# Patient Record
Sex: FEMALE | Race: WHITE | NOT HISPANIC OR LATINO | URBAN - METROPOLITAN AREA
[De-identification: names, ages, dates, MRNs, and addresses within clinical notes are randomized per-mention and may not be internally consistent; named-entity substitution may affect disease eponyms.]

---

## 2017-01-25 ENCOUNTER — OFFICE VISIT (OUTPATIENT)
Dept: OBGYN | Facility: CLINIC | Age: 47
End: 2017-01-25
Payer: COMMERCIAL

## 2017-01-25 VITALS — WEIGHT: 201 LBS | HEIGHT: 67 IN | BODY MASS INDEX: 31.55 KG/M2

## 2017-01-25 DIAGNOSIS — Z11.51 SCREENING FOR HUMAN PAPILLOMAVIRUS: ICD-10-CM

## 2017-01-25 DIAGNOSIS — E03.9 ACQUIRED HYPOTHYROIDISM: ICD-10-CM

## 2017-01-25 DIAGNOSIS — Z12.4 SCREENING FOR CERVICAL CANCER: ICD-10-CM

## 2017-01-25 DIAGNOSIS — Z31.9 PROCREATIVE MANAGEMENT: ICD-10-CM

## 2017-01-25 DIAGNOSIS — Z01.419 ENCOUNTER FOR GYNECOLOGICAL EXAMINATION WITHOUT ABNORMAL FINDING: Primary | ICD-10-CM

## 2017-01-25 DIAGNOSIS — Z31.41 FERTILITY TESTING: ICD-10-CM

## 2017-01-25 PROCEDURE — G0145 SCR C/V CYTO,THINLAYER,RESCR: HCPCS | Performed by: OBSTETRICS & GYNECOLOGY

## 2017-01-25 PROCEDURE — 99386 PREV VISIT NEW AGE 40-64: CPT | Performed by: OBSTETRICS & GYNECOLOGY

## 2017-01-25 PROCEDURE — 87624 HPV HI-RISK TYP POOLED RSLT: CPT | Performed by: OBSTETRICS & GYNECOLOGY

## 2017-01-25 PROCEDURE — 83520 IMMUNOASSAY QUANT NOS NONAB: CPT | Mod: 90 | Performed by: OBSTETRICS & GYNECOLOGY

## 2017-01-25 PROCEDURE — 36415 COLL VENOUS BLD VENIPUNCTURE: CPT | Performed by: OBSTETRICS & GYNECOLOGY

## 2017-01-25 PROCEDURE — 99000 SPECIMEN HANDLING OFFICE-LAB: CPT | Performed by: OBSTETRICS & GYNECOLOGY

## 2017-01-25 RX ORDER — LEVOTHYROXINE SODIUM 88 UG/1
88 TABLET ORAL
COMMUNITY
Start: 2016-03-22

## 2017-01-25 ASSESSMENT — ANXIETY QUESTIONNAIRES
IF YOU CHECKED OFF ANY PROBLEMS ON THIS QUESTIONNAIRE, HOW DIFFICULT HAVE THESE PROBLEMS MADE IT FOR YOU TO DO YOUR WORK, TAKE CARE OF THINGS AT HOME, OR GET ALONG WITH OTHER PEOPLE: NOT DIFFICULT AT ALL
2. NOT BEING ABLE TO STOP OR CONTROL WORRYING: NOT AT ALL
5. BEING SO RESTLESS THAT IT IS HARD TO SIT STILL: NOT AT ALL
7. FEELING AFRAID AS IF SOMETHING AWFUL MIGHT HAPPEN: NOT AT ALL
1. FEELING NERVOUS, ANXIOUS, OR ON EDGE: NOT AT ALL
3. WORRYING TOO MUCH ABOUT DIFFERENT THINGS: NOT AT ALL
GAD7 TOTAL SCORE: 0
6. BECOMING EASILY ANNOYED OR IRRITABLE: NOT AT ALL

## 2017-01-25 ASSESSMENT — PATIENT HEALTH QUESTIONNAIRE - PHQ9: 5. POOR APPETITE OR OVEREATING: NOT AT ALL
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
## 2017-01-25 NOTE — MR AVS SNAPSHOT
"              After Visit Summary   1/25/2017    Edna Garzon    MRN: [id number]           Patient Information     Date Of Birth          1970        Visit Information        Provider Department      1/25/2017 10:30 AM Marilyn Roque MD [facility]        Today's Diagnoses     Encounter for gynecological examination without abnormal finding [Z01.419]    -  1     Screening for human papillomavirus         Screening for cervical cancer         Fertility testing         Procreative management            Follow-ups after your visit        Who to contact     If you have questions or need follow up information about today's clinic visit or your schedule please contact [facility] directly at [phone].  Normal or non-critical lab and imaging results will be communicated to you by MyChart, letter or phone within 4 business days after the clinic has received the results. If you do not hear from us within 7 days, please contact the clinic through MyChart or phone. If you have a critical or abnormal lab result, we will notify you by phone as soon as possible.  Submit refill requests through [employer] or call your pharmacy and they will forward the refill request to us. Please allow 3 business days for your refill to be completed.          Additional Information About Your Visit        MyChart Information     [employer] lets you send messages to your doctor, view your test results, renew your prescriptions, schedule appointments and more. To sign up, go to [email or web address] . Click on \"Log in\" on the left side of the screen, which will take you to the Welcome page. Then click on \"Sign up Now\" on the right side of the page.     You will be asked to enter the access code listed below, as well as some personal information. Please follow the directions to create your username and password.     Your access code is: [email or web address]  Expires: 4/25/2017 12:30 PM     Your access code will " " in 90 days. If you need help or a new code, please call your [place] clinic or [phone].        Care EveryWhere ID     This is your Care EveryWhere ID. This could be used by other organizations to access your [place] medical records  [id number]        Your Vitals Were     Height BMI (Body Mass Index) Last Period             5' 6.5\" (1.689 m) 31.96 kg/m2 2017 (Exact Date)          Blood Pressure from Last 3 Encounters:   No data found for BP    Weight from Last 3 Encounters:   17 201 lb (91.173 kg)              We Performed the Following     Anti-Mullerian hormone     HPV High Risk Types DNA Cervical     Pap imaged thin layer screen with HPV - recommended age 30 - 65        Primary Care Provider Office Phone # Fax #    Danny Murillo [phone]       [facility] [address]  [facility] [place] [address]        Thank you!     Thank you for choosing [facility] [place]  for your care. Our goal is always to provide you with excellent care. Hearing back from our patients is one way we can continue to improve our services. Please take a few minutes to complete the written survey that you may receive in the mail after your visit with us. Thank you!             Your Updated Medication List - Protect others around you: Learn how to safely use, store and throw away your medicines at www.disposemymeds.org.          This list is accurate as of: 17 12:30 PM.  Always use your most recent med list.                   Brand Name Dispense Instructions for use    levothyroxine 88 MCG tablet    SYNTHROID/LEVOTHROID     Take 88 mcg by mouth         "

## 2017-01-26 ASSESSMENT — ANXIETY QUESTIONNAIRES: GAD7 TOTAL SCORE: 0

## 2017-01-26 ASSESSMENT — PATIENT HEALTH QUESTIONNAIRE - PHQ9: SUM OF ALL RESPONSES TO PHQ QUESTIONS 1-9: 0

## 2017-01-27 LAB
COPATH REPORT: NORMAL
MIS SERPL-MCNC: 0.42 NG/ML
PAP: NORMAL

## 2017-01-30 LAB
FINAL DIAGNOSIS: NORMAL
HPV HR 12 DNA CVX QL NAA+PROBE: NEGATIVE
HPV16 DNA SPEC QL NAA+PROBE: NEGATIVE
HPV18 DNA SPEC QL NAA+PROBE: NEGATIVE
SPECIMEN DESCRIPTION: NORMAL

## 2017-03-16 ENCOUNTER — TELEPHONE (OUTPATIENT)
Dept: OBGYN | Facility: CLINIC | Age: 47
End: 2017-03-16

## 2017-03-16 NOTE — TELEPHONE ENCOUNTER
Notes Recorded by Marilyn Roque MD on 2/4/2017 at 8:47 PM  Pap is normal and HPV Is neg. Rec. Next cervical cancer screen in 5 yrs but strongly encourage annual exams and mammo. As far as her AMH for pregnancy it is quite low. It's not impossible to get pregnant but if she truly wanted to pursue this then there is nothing that would be better than just going straight to [facility] for a consult on IVF. They will tell her if using her own eggs is even an option or if it would have to be donor eggs in which case then she can make her decision. Can refer to CRM or [facility]. Patient does speak english but if doesn't seem to have adequate understanding then could use [place] interpretor    Informed pt of result note. Pt stated understanding and had no further questions. She stated she would thinks about her options and call back to inform what she decides.

## 2017-03-31 ENCOUNTER — OFFICE VISIT (OUTPATIENT)
Dept: OBGYN | Facility: CLINIC | Age: 47
End: 2017-03-31
Payer: COMMERCIAL

## 2017-03-31 VITALS
HEIGHT: 67 IN | WEIGHT: 200 LBS | BODY MASS INDEX: 31.39 KG/M2 | SYSTOLIC BLOOD PRESSURE: 122 MMHG | DIASTOLIC BLOOD PRESSURE: 80 MMHG

## 2017-03-31 DIAGNOSIS — N97.9 INFERTILITY, FEMALE: Primary | ICD-10-CM

## 2017-03-31 PROCEDURE — 99214 OFFICE O/P EST MOD 30 MIN: CPT | Performed by: OBSTETRICS & GYNECOLOGY

## 2017-03-31 RX ORDER — CLOMIPHENE CITRATE 50 MG/1
50 TABLET ORAL DAILY
Qty: 5 TABLET | Refills: 2 | Status: SHIPPED | OUTPATIENT
Start: 2017-03-31

## 2017-03-31 NOTE — MR AVS SNAPSHOT
"              After Visit Summary   3/31/2017    Edna Garzon    MRN: [id number]           Patient Information     Date Of Birth          1970        Visit Information        Provider Department      3/31/2017 2:00 PM Marilyn Roque MD [facility]        Today's Diagnoses     Infertility, female    -  1       Follow-ups after your visit        Who to contact     If you have questions or need follow up information about today's clinic visit or your schedule please contact [facility] directly at [phone].  Normal or non-critical lab and imaging results will be communicated to you by [employer], letter or phone within 4 business days after the clinic has received the results. If you do not hear from us within 7 days, please contact the clinic through [employer] or phone. If you have a critical or abnormal lab result, we will notify you by phone as soon as possible.  Submit refill requests through [employer] or call your pharmacy and they will forward the refill request to us. Please allow 3 business days for your refill to be completed.          Additional Information About Your Visit        MyChart Information     [employer] lets you send messages to your doctor, view your test results, renew your prescriptions, schedule appointments and more. To sign up, go to [email or web address] . Click on \"Log in\" on the left side of the screen, which will take you to the Welcome page. Then click on \"Sign up Now\" on the right side of the page.     You will be asked to enter the access code listed below, as well as some personal information. Please follow the directions to create your username and password.     Your access code is: [email or web address]  Expires: 2017  1:30 PM     Your access code will  in 90 days. If you need help or a new code, please call your [facility] or [phone].        Care EveryWhere ID     This is your Care EveryWhere ID. This could be used by other " "organizations to access your [place] medical records  [id number]        Your Vitals Were     Height Last Period BMI (Body Mass Index)             5' 6.5\" (1.689 m) 03/16/2017 (Approximate) 31.8 kg/m2          Blood Pressure from Last 3 Encounters:   03/31/17 122/80    Weight from Last 3 Encounters:   03/31/17 200 lb (90.7 kg)   01/25/17 201 lb (91.2 kg)              Today, you had the following     No orders found for display         Today's Medication Changes          These changes are accurate as of: 3/31/17  3:04 PM.  If you have any questions, ask your nurse or doctor.               Start taking these medicines.        Dose/Directions    clomiPHENE 50 MG tablet   Commonly known as:  CLOMID   Used for:  Infertility, female   Started by:  Marilyn Roque MD        Dose:  50 mg   Take 1 tablet (50 mg) by mouth daily   Quantity:  5 tablet   Refills:  2            Where to get your medicines      These medications were sent to [facility] [place] - [address]  [address] [address]     Phone:  [phone]     clomiPHENE 50 MG tablet                Primary Care Provider Office Phone # Fax #    Danny Murillo [phone]       [facility] [address] [facility] [address]  [facility] [address]        Thank you!     Thank you for choosing [facility] [place]  for your care. Our goal is always to provide you with excellent care. Hearing back from our patients is one way we can continue to improve our services. Please take a few minutes to complete the written survey that you may receive in the mail after your visit with us. Thank you!             Your Updated Medication List - Protect others around you: Learn how to safely use, store and throw away your medicines at www.disposemymeds.org.          This list is accurate as of: 3/31/17  3:04 PM.  Always use your most recent med list.                   Brand Name Dispense Instructions for use    " clomiPHENE 50 MG tablet    CLOMID    5 tablet    Take 1 tablet (50 mg) by mouth daily       levothyroxine 88 MCG tablet    SYNTHROID/LEVOTHROID     Take 88 mcg by mouth

## 2017-03-31 NOTE — PROGRESS NOTES
"    SUBJECTIVE:                                                   Edna Garzon is a 47 year old female who presents to clinic today for the following health issue(s):  Patient presents with:  Follow Up For: lab results regarding homone level      Additional information: NA    HPI:  Patient was scheduled to discuss \"birth control\" however is actually here to discuss getting pregnant.  Patient was here for annual exam recently and has a BF and really wants to get pregnant.   We did an AMH when she was last here and it was very low at 0.4. The RN's called her with that result and explained that she would have a very low chance of getting pregnant without IVF and realistically a lower than 5% chance of getting pregnant without donor eggs. Patient isn't financially able to do this and doesn't think she would do this with donor eggs either way.  Feels like she would like to at least try a medicine to see if it can help her get pregnant.  Has never tracked her cycles or checked for ovulation or anything like that.  Her BF hasn't ever had a semen analysis and she hasn't ever had an HSG though no history that would be suspicious for tubal issues    Patient's last menstrual period was 2017 (approximate)..   Patient is sexually active, .  Using none for contraception.    reports that she has never smoked. She does not have any smokeless tobacco history on file.    STD testing offered?  Declined    Health maintenance updated:  yes    Today's PHQ-2 Score: No flowsheet data found.  Today's PHQ-9 Score:   PHQ-9 SCORE 2017   Total Score 0     Today's AGUSTIN-7 Score:   AGUSTIN-7 SCORE 2017   Total Score 0       Problem list and histories reviewed & adjusted, as indicated.  Additional history: as documented.    Patient Active Problem List   Diagnosis     Acquired hypothyroidism     Past Surgical History:   Procedure Laterality Date     DILATION AND CURETTAGE SUCTION      EAB     KNEE SURGERY      after fall and " "patellar fracture      Social History   Substance Use Topics     Smoking status: Never Smoker     Smokeless tobacco: Not on file     Alcohol use No      Problem (# of Occurrences) Relation (Name,Age of Onset)    DIABETES (1) Mother    Hypertension (2) Mother, Father            Current Outpatient Prescriptions   Medication Sig     clomiPHENE (CLOMID) 50 MG tablet Take 1 tablet (50 mg) by mouth daily     levothyroxine (SYNTHROID/LEVOTHROID) 88 MCG tablet Take 88 mcg by mouth     No current facility-administered medications for this visit.      No Known Allergies    ROS:  12 point review of systems negative other than symptoms noted below.    OBJECTIVE:     /80  Ht 5' 6.5\" (1.689 m)  Wt 200 lb (90.7 kg)  LMP 03/16/2017 (Approximate)  BMI 31.8 kg/m2  Body mass index is 31.8 kg/(m^2).    Exam:  Constitutional:  Appearance: Well nourished, well developed alert, in no acute distress     In-Clinic Test Results:  No results found for this or any previous visit (from the past 24 hour(s)).    ASSESSMENT/PLAN:                                                        ICD-10-CM    1. Infertility, female N97.9 clomiPHENE (CLOMID) 50 MG tablet         Spent 25 minutes with her discussing all the components involved in getting pregnant, both male and female. Discussed her very low chance of pregnancy with her ovarian reserve where it is especially given her age. Patient understands this but would still adan to try something.  In trying to balance doing something with saving costs we will have the patient do 3 months of unmonitored clomid at 50mg day 3-7 and she will then need to do OPKs. Discussed how to do OPKs and when, where to find them, how they work, how to time I.C around a +OPK. Discussed follicle studies and IUIs or just clomid. Patient will just do clomid unmonitored at this point. If +OPKs for 3 months but not pregnant may f/u with me for higher dose clomid and maybe follicle study +/- IUI    Marilyn Roque, " MD  [facility] [place]

## 2023-01-06 ENCOUNTER — HOSPITAL ENCOUNTER (EMERGENCY)
Facility: CLINIC | Age: 53
Discharge: HOME | End: 2023-01-06
Attending: EMERGENCY MEDICINE | Admitting: EMERGENCY MEDICINE
Payer: COMMERCIAL

## 2023-01-06 ENCOUNTER — APPOINTMENT (OUTPATIENT)
Dept: MRI IMAGING | Facility: CLINIC | Age: 53
End: 2023-01-06
Attending: EMERGENCY MEDICINE
Payer: COMMERCIAL

## 2023-01-06 VITALS
RESPIRATION RATE: 16 BRPM | DIASTOLIC BLOOD PRESSURE: 78 MMHG | HEART RATE: 78 BPM | SYSTOLIC BLOOD PRESSURE: 139 MMHG | TEMPERATURE: 98.3 F | OXYGEN SATURATION: 99 %

## 2023-01-06 DIAGNOSIS — R03.0 ELEVATED BLOOD PRESSURE READING WITHOUT DIAGNOSIS OF HYPERTENSION: ICD-10-CM

## 2023-01-06 DIAGNOSIS — R73.9 HYPERGLYCEMIA: ICD-10-CM

## 2023-01-06 DIAGNOSIS — R42 VERTIGO: ICD-10-CM

## 2023-01-06 LAB
ANION GAP SERPL CALCULATED.3IONS-SCNC: 10 MMOL/L (ref 7–15)
BASOPHILS # BLD AUTO: 0 10E3/UL (ref 0–0.2)
BASOPHILS NFR BLD AUTO: 1 %
BUN SERPL-MCNC: 10.8 MG/DL (ref 6–20)
CALCIUM SERPL-MCNC: 8.9 MG/DL (ref 8.6–10)
CHLORIDE SERPL-SCNC: 102 MMOL/L (ref 98–107)
CREAT SERPL-MCNC: 0.59 MG/DL (ref 0.51–0.95)
DEPRECATED HCO3 PLAS-SCNC: 26 MMOL/L (ref 22–29)
EOSINOPHIL # BLD AUTO: 0.1 10E3/UL (ref 0–0.7)
EOSINOPHIL NFR BLD AUTO: 1 %
ERYTHROCYTE [DISTWIDTH] IN BLOOD BY AUTOMATED COUNT: 14.2 % (ref 10–15)
GFR SERPL CREATININE-BSD FRML MDRD: >90 ML/MIN/1.73M2
GLUCOSE SERPL-MCNC: 202 MG/DL (ref 70–99)
HCT VFR BLD AUTO: 41.7 % (ref 35–47)
HGB BLD-MCNC: 13.5 G/DL (ref 11.7–15.7)
HOLD SPECIMEN: NORMAL
HOLD SPECIMEN: NORMAL
IMM GRANULOCYTES # BLD: 0 10E3/UL
IMM GRANULOCYTES NFR BLD: 0 %
LYMPHOCYTES # BLD AUTO: 2 10E3/UL (ref 0.8–5.3)
LYMPHOCYTES NFR BLD AUTO: 29 %
MAGNESIUM SERPL-MCNC: 1.8 MG/DL (ref 1.7–2.3)
MCH RBC QN AUTO: 27.7 PG (ref 26.5–33)
MCHC RBC AUTO-ENTMCNC: 32.4 G/DL (ref 31.5–36.5)
MCV RBC AUTO: 86 FL (ref 78–100)
MONOCYTES # BLD AUTO: 0.5 10E3/UL (ref 0–1.3)
MONOCYTES NFR BLD AUTO: 7 %
NEUTROPHILS # BLD AUTO: 4.1 10E3/UL (ref 1.6–8.3)
NEUTROPHILS NFR BLD AUTO: 62 %
NRBC # BLD AUTO: 0 10E3/UL
NRBC BLD AUTO-RTO: 0 /100
PLATELET # BLD AUTO: 252 10E3/UL (ref 150–450)
POTASSIUM SERPL-SCNC: 4 MMOL/L (ref 3.4–5.3)
RBC # BLD AUTO: 4.88 10E6/UL (ref 3.8–5.2)
SODIUM SERPL-SCNC: 138 MMOL/L (ref 136–145)
T4 FREE SERPL-MCNC: 1.09 NG/DL (ref 0.9–1.7)
TROPONIN T SERPL HS-MCNC: <6 NG/L
TROPONIN T SERPL HS-MCNC: <6 NG/L
TSH SERPL DL<=0.005 MIU/L-ACNC: 5.91 UIU/ML (ref 0.3–4.2)
WBC # BLD AUTO: 6.7 10E3/UL (ref 4–11)

## 2023-01-06 PROCEDURE — 99285 EMERGENCY DEPT VISIT HI MDM: CPT | Mod: 25

## 2023-01-06 PROCEDURE — 258N000003 HC RX IP 258 OP 636: Performed by: EMERGENCY MEDICINE

## 2023-01-06 PROCEDURE — 36415 COLL VENOUS BLD VENIPUNCTURE: CPT | Performed by: EMERGENCY MEDICINE

## 2023-01-06 PROCEDURE — 85025 COMPLETE CBC W/AUTO DIFF WBC: CPT | Performed by: EMERGENCY MEDICINE

## 2023-01-06 PROCEDURE — 84439 ASSAY OF FREE THYROXINE: CPT | Performed by: EMERGENCY MEDICINE

## 2023-01-06 PROCEDURE — 80048 BASIC METABOLIC PNL TOTAL CA: CPT | Performed by: EMERGENCY MEDICINE

## 2023-01-06 PROCEDURE — 93005 ELECTROCARDIOGRAM TRACING: CPT

## 2023-01-06 PROCEDURE — A9585 GADOBUTROL INJECTION: HCPCS | Performed by: EMERGENCY MEDICINE

## 2023-01-06 PROCEDURE — 255N000002 HC RX 255 OP 636: Performed by: EMERGENCY MEDICINE

## 2023-01-06 PROCEDURE — 84443 ASSAY THYROID STIM HORMONE: CPT | Performed by: EMERGENCY MEDICINE

## 2023-01-06 PROCEDURE — 250N000013 HC RX MED GY IP 250 OP 250 PS 637: Performed by: EMERGENCY MEDICINE

## 2023-01-06 PROCEDURE — 96360 HYDRATION IV INFUSION INIT: CPT

## 2023-01-06 PROCEDURE — 83735 ASSAY OF MAGNESIUM: CPT | Performed by: EMERGENCY MEDICINE

## 2023-01-06 PROCEDURE — 250N000011 HC RX IP 250 OP 636: Performed by: EMERGENCY MEDICINE

## 2023-01-06 PROCEDURE — 84484 ASSAY OF TROPONIN QUANT: CPT | Performed by: EMERGENCY MEDICINE

## 2023-01-06 PROCEDURE — 96361 HYDRATE IV INFUSION ADD-ON: CPT

## 2023-01-06 PROCEDURE — 70553 MRI BRAIN STEM W/O & W/DYE: CPT

## 2023-01-06 PROCEDURE — 84484 ASSAY OF TROPONIN QUANT: CPT | Mod: 91 | Performed by: EMERGENCY MEDICINE

## 2023-01-06 PROCEDURE — 96374 THER/PROPH/DIAG INJ IV PUSH: CPT | Mod: 59

## 2023-01-06 RX ORDER — GADOBUTROL 604.72 MG/ML
11 INJECTION INTRAVENOUS ONCE
Status: COMPLETED | OUTPATIENT
Start: 2023-01-06 | End: 2023-01-06

## 2023-01-06 RX ORDER — MECLIZINE HYDROCHLORIDE 25 MG/1
25 TABLET ORAL ONCE
Status: COMPLETED | OUTPATIENT
Start: 2023-01-06 | End: 2023-01-06

## 2023-01-06 RX ORDER — ONDANSETRON 2 MG/ML
4 INJECTION INTRAMUSCULAR; INTRAVENOUS ONCE
Status: COMPLETED | OUTPATIENT
Start: 2023-01-06 | End: 2023-01-06

## 2023-01-06 RX ORDER — MECLIZINE HYDROCHLORIDE 25 MG/1
25 TABLET ORAL EVERY 6 HOURS PRN
Qty: 30 TABLET | Refills: 1 | Status: SHIPPED | OUTPATIENT
Start: 2023-01-06

## 2023-01-06 RX ADMIN — GADOBUTROL 11 ML: 604.72 INJECTION INTRAVENOUS at 15:04

## 2023-01-06 RX ADMIN — SODIUM CHLORIDE 1000 ML: 9 INJECTION, SOLUTION INTRAVENOUS at 14:46

## 2023-01-06 RX ADMIN — MECLIZINE HYDROCHLORIDE 25 MG: 25 TABLET ORAL at 14:47

## 2023-01-06 RX ADMIN — ONDANSETRON 4 MG: 2 INJECTION INTRAMUSCULAR; INTRAVENOUS at 14:47

## 2023-01-06 ASSESSMENT — ENCOUNTER SYMPTOMS
NAUSEA: 1
WEAKNESS: 0
NUMBNESS: 0
HEADACHES: 0
SHORTNESS OF BREATH: 0
DIZZINESS: 1
VOMITING: 0
PALPITATIONS: 0
DIARRHEA: 0
MYALGIAS: 1
SPEECH DIFFICULTY: 0

## 2023-01-06 ASSESSMENT — ACTIVITIES OF DAILY LIVING (ADL)
ADLS_ACUITY_SCORE: 33
ADLS_ACUITY_SCORE: 33

## 2023-01-06 NOTE — DISCHARGE INSTRUCTIONS
Meclizine as needed for dizziness.  Lots of fluids.  Start taking your blood pressure twice a day.  Keep a log of this and take it to a primary care provider appointment.  They will be able to determine if you need to start blood pressure medications and discuss the right dose with this information. You will need to call to arrange an appointment.  Clinic provided.  You also have high blood sugar today.  You can talk with primary care about this or your endocrinologist.  You want to make sure you do not need started on medications for diabetes.  Return immediately if you have worsening of symptoms or any new concerns.

## 2023-01-06 NOTE — ED TRIAGE NOTES
Presents from clinic with hypertension, ekg from clinic shows NSR. Went to clinic this AM for dizziness and fatigue over the past few days. Hypertensive in triage otherwise VSS on RA.

## 2023-01-06 NOTE — ED PROVIDER NOTES
"    History     Chief Complaint:  Dizziness     The history is provided by the patient.      Edna Pelayo V is a 52 year old female who presents with dizziness. For the last week she has had dizziness described as a room-spinning sensation upon changing position. She describes nausea and intermittent blurred vision with a \"crampy, heavy\" sensation in her legs. She denies weakness, numbness, tingling, headache, or speech change. She also denies chest pain, shortness of breath, palpitations, vomiting, and diarrhea. She has had no recent head injury.    She has been checking her blood pressure at home (she has no history of hypertension but has been using her mother's cuff) and when it was 160s systolic she presented to urgent care. They referred her to the ED.     Independent Historian: Yes, as above     Review of External Notes:  Urgent Care visit today     ROS:  Review of Systems   Eyes: Positive for visual disturbance.   Respiratory: Negative for shortness of breath.    Cardiovascular: Negative for chest pain and palpitations.   Gastrointestinal: Positive for nausea. Negative for diarrhea and vomiting.   Musculoskeletal: Positive for myalgias (leg pain).   Neurological: Positive for dizziness. Negative for speech difficulty, weakness, numbness and headaches.        Negative for paresthesias   All other systems reviewed and are negative.    Allergies:  No Known Allergies     Medications:    Wellbutrin  Zovirax  Vitamin D   Synthroid  Prilosec    Past Medical History:    Hypothyroidism  Depression     Social History:  Patient is .   No PCP, but has an endocrinologist.  Does not smoke or use alcohol.     Physical Exam     Patient Vitals for the past 24 hrs:   BP Temp Temp src Pulse Resp SpO2   01/06/23 1703 139/78 -- -- 78 16 99 %   01/06/23 1450 (!) 145/86 -- -- 72 -- 99 %   01/06/23 1147 (!) 175/114 -- -- -- -- --   01/06/23 1145 -- 98.3  F (36.8  C) Temporal 88 19 98 %      Physical " Exam  General: Well-developed and well-nourished. Well appearing middle aged woman. Cooperative.  Head:  Atraumatic.  Eyes:  Conjunctivae, lids, and sclerae are normal.  Neck:  Supple. Normal range of motion.  CV:  Regular rate and rhythm. Normal heart sounds with no murmurs, rubs, or gallops detected.  Resp:  No respiratory distress. Clear to auscultation bilaterally without decreased breath sounds, wheezing, rales, or rhonchi.  GI:  Soft. Non-distended. Non-tender.    MS:  Normal ROM. No bilateral lower extremity edema.  Skin:  Warm. Non-diaphoretic. No pallor.  Neuro: Awake. A&Ox3.    Strength 5/5 bilateral upper and lower extremities.    No pronator drift.    Sensation intact to light touch.    No facial droop. No dysarthria.    No aphasia.    PERRL.     No dysmetria.    No dysdiadochokinesis.  Psych:  Normal mood and affect. Normal speech.  Vitals reviewed    Emergency Department Course   EKG  Indication: Hypertension and dizziness  Time: 1232  Rate 79 bpm. UT interval 160. QRS duration 90. QT/QTc 396/454.   Normal sinus rhythm. Normal ECG.  No acute ST changes.  No prior for comparison.     Imaging:  MR Brain w/o & w Contrast   Final Result   IMPRESSION:    1.  No acute intracranial finding. No evidence for recent ischemia,   intracranial hemorrhage, or mass.   2.  A few T2 hyperintense foci in the white matter are nonspecific but   most commonly reflect gliosis related to prior ischemic or   inflammatory insult.      HESHAM GATICA MD            SYSTEM ID:  [id number]         Report per radiology    Laboratory:  Labs Ordered and Resulted from Time of ED Arrival to Time of ED Departure   BASIC METABOLIC PANEL - Abnormal       Result Value    Sodium 138      Potassium 4.0      Chloride 102      Carbon Dioxide (CO2) 26      Anion Gap 10      Urea Nitrogen 10.8      Creatinine 0.59      Calcium 8.9      Glucose 202 (*)     GFR Estimate >90     TSH WITH FREE T4 REFLEX - Abnormal    TSH 5.91 (*)    TROPONIN T,  HIGH SENSITIVITY - Normal    Troponin T, High Sensitivity <6     TROPONIN T, HIGH SENSITIVITY - Normal    Troponin T, High Sensitivity <6     MAGNESIUM - Normal    Magnesium 1.8     T4 FREE - Normal    Free T4 1.09     CBC WITH PLATELETS AND DIFFERENTIAL    WBC Count 6.7      RBC Count 4.88      Hemoglobin 13.5      Hematocrit 41.7      MCV 86      MCH 27.7      MCHC 32.4      RDW 14.2      Platelet Count 252      % Neutrophils 62      % Lymphocytes 29      % Monocytes 7      % Eosinophils 1      % Basophils 1      % Immature Granulocytes 0      NRBCs per 100 WBC 0      Absolute Neutrophils 4.1      Absolute Lymphocytes 2.0      Absolute Monocytes 0.5      Absolute Eosinophils 0.1      Absolute Basophils 0.0      Absolute Immature Granulocytes 0.0      Absolute NRBCs 0.0        Emergency Department Course & Assessments:  Interventions:  Medications   0.9% sodium chloride BOLUS (1000 mLs Intravenous Stopped 1/6/23 1700)   meclizine (ANTIVERT) tablet 25 mg (25 mg Oral Given 1/6/23 1447)   ondansetron (ZOFRAN) injection 4 mg (4 mg Intravenous Given 1/6/23 1447)      Consultations/Discussion of Management or Tests:  ED Course as of 01/06/23 1642   Fri Jan 06, 2023   1420 I obtained the history and examined the patient as noted above.    1640 I rechecked and updated the patient. She was feeling improved.     Social Determinants of Health affecting care:  Supportive  and friend. English as a second language.    Disposition:  The patient was discharged.     Impression & Plan    Medical Decision Making:  Edna is a 52 year old woman presenting with 1 week of positional vertigo, intermittent blurred vision, and a heavy sensation in her legs.  She ultimately sought care today because she has been checking her blood pressure at home and found it elevated at 160s systolic.  It is unclear why she was checking her blood pressure as she does not have a history of hypertension.  Here she appears well with blood pressure  improving from 170s systolic to 139 systolic without intervention.  She has no focal neurologic deficits.  However, because she is having vision changes and subjective leg symptoms, she was sent for MRI of the brain.  This does not reveal acute pathology with nonspecific foci likely reflecting gliosis.  Her EKG is similarly reassuring without acute ST changes or arrhythmias.  2 troponins were both undetectable and a cardiac etiology for her symptoms is considered unlikely.  There is no kidney injury, electrolyte derangement, leukocytosis, or anemia.  There is hyperglycemia to 202 without prior diagnosis of diabetes.  Her TSH is abnormal but free T4 is normal.    During her work-up she was treated with IV fluids, meclizine, and Zofran and on repeat evaluation she is feeling improved.  She is comfortable with plan for discharge with meclizine as needed and lots of fluids.  I have recommended she check her blood pressure just twice a day and keep a log of this to take to her primary care provider follow up appointment and determine if she needs to be started on antihypertensives or not.  I have also encouraged her to follow-up with her endocrinologist regarding her TSH and hyperglycemia.  She understands to return with worsening or new symptoms.  All questions answered.  Amenable to discharge.    Diagnosis:    ICD-10-CM    1. Vertigo  R42       2. Elevated blood pressure reading without diagnosis of hypertension  R03.0       3. Hyperglycemia  R73.9            Discharge Medications:  Discharge Medication List as of 1/6/2023  5:00 PM      START taking these medications    Details   meclizine (ANTIVERT) 25 MG tablet Take 1 tablet (25 mg) by mouth every 6 hours as needed for dizziness, Disp-30 tablet, R-1, E-Prescribe              Scribe Disclosure:  ELINOR, Stephanie Rodriguez, am serving as a scribe at 2:17 PM on 1/6/2023 to document services personally performed by Linette Mena MD based on my observations and the provider's  statements to me.     1/6/2023   Linette Mena MD Dixson, Kylie S, MD  01/23/23 2035

## 2023-01-07 LAB
ATRIAL RATE - MUSE: 79 BPM
DIASTOLIC BLOOD PRESSURE - MUSE: NORMAL MMHG
INTERPRETATION ECG - MUSE: NORMAL
P AXIS - MUSE: 28 DEGREES
PR INTERVAL - MUSE: 160 MS
QRS DURATION - MUSE: 90 MS
QT - MUSE: 396 MS
QTC - MUSE: 454 MS
R AXIS - MUSE: 12 DEGREES
SYSTOLIC BLOOD PRESSURE - MUSE: NORMAL MMHG
T AXIS - MUSE: 43 DEGREES
VENTRICULAR RATE- MUSE: 79 BPM

## 2023-05-06 ENCOUNTER — HEALTH MAINTENANCE LETTER (OUTPATIENT)
Age: 53
End: 2023-05-06

## 2024-02-21 ENCOUNTER — TRANSFERRED RECORDS (OUTPATIENT)
Dept: HEALTH INFORMATION MANAGEMENT | Facility: CLINIC | Age: 54
End: 2024-02-21

## 2024-07-13 ENCOUNTER — HEALTH MAINTENANCE LETTER (OUTPATIENT)
Age: 54
End: 2024-07-13

## 2025-05-17 ENCOUNTER — HEALTH MAINTENANCE LETTER (OUTPATIENT)
Age: 55
End: 2025-05-17

## 2025-07-19 ENCOUNTER — HEALTH MAINTENANCE LETTER (OUTPATIENT)
Age: 55
End: 2025-07-19